# Patient Record
Sex: MALE | Race: WHITE | Employment: FULL TIME | URBAN - METROPOLITAN AREA
[De-identification: names, ages, dates, MRNs, and addresses within clinical notes are randomized per-mention and may not be internally consistent; named-entity substitution may affect disease eponyms.]

---

## 2018-04-25 PROBLEM — Z82.49 FAMILY HISTORY OF CARDIOMYOPATHY: Status: ACTIVE | Noted: 2018-04-25

## 2018-04-25 PROBLEM — I51.7 LVH (LEFT VENTRICULAR HYPERTROPHY): Status: ACTIVE | Noted: 2018-04-25

## 2018-04-25 PROBLEM — F40.243 FEAR OF FLYING: Status: ACTIVE | Noted: 2018-04-25

## 2018-04-25 PROBLEM — J45.30 MILD PERSISTENT ASTHMA WITHOUT COMPLICATION: Status: ACTIVE | Noted: 2018-04-25

## 2020-10-12 PROBLEM — F90.2 ATTENTION DEFICIT HYPERACTIVITY DISORDER (ADHD), COMBINED TYPE: Status: ACTIVE | Noted: 2020-10-12

## 2022-03-19 PROBLEM — I51.7 LVH (LEFT VENTRICULAR HYPERTROPHY): Status: ACTIVE | Noted: 2018-04-25

## 2022-03-19 PROBLEM — F40.243 FEAR OF FLYING: Status: ACTIVE | Noted: 2018-04-25

## 2022-03-19 PROBLEM — J45.30 MILD PERSISTENT ASTHMA WITHOUT COMPLICATION: Status: ACTIVE | Noted: 2018-04-25

## 2022-03-19 PROBLEM — Z82.49 FAMILY HISTORY OF CARDIOMYOPATHY: Status: ACTIVE | Noted: 2018-04-25

## 2022-03-20 PROBLEM — F90.2 ATTENTION DEFICIT HYPERACTIVITY DISORDER (ADHD), COMBINED TYPE: Status: ACTIVE | Noted: 2020-10-12

## 2022-04-22 PROBLEM — E78.2 MIXED HYPERLIPIDEMIA: Status: ACTIVE | Noted: 2022-04-22

## 2022-04-22 PROBLEM — I10 HTN (HYPERTENSION), BENIGN: Status: ACTIVE | Noted: 2022-04-22

## 2022-05-09 PROBLEM — R07.2 PRECORDIAL PAIN: Status: ACTIVE | Noted: 2022-05-09

## 2022-05-17 ENCOUNTER — HOSPITAL ENCOUNTER (OUTPATIENT)
Dept: CT IMAGING | Age: 32
Discharge: HOME | End: 2022-05-17
Attending: INTERNAL MEDICINE
Payer: SELF-PAY

## 2022-05-17 DIAGNOSIS — Z82.49 FAMILY HISTORY OF CARDIOMYOPATHY: ICD-10-CM

## 2022-05-17 DIAGNOSIS — I51.7 LVH (LEFT VENTRICULAR HYPERTROPHY): ICD-10-CM

## 2022-05-17 PROCEDURE — 75571 CT HRT W/O DYE W/CA TEST: CPT

## 2022-05-27 ENCOUNTER — HOSPITAL ENCOUNTER (OUTPATIENT)
Dept: SLEEP CENTER | Age: 32
Discharge: HOME | End: 2022-05-30
Payer: COMMERCIAL

## 2022-05-31 PROCEDURE — 95806 SLEEP STUDY UNATT&RESP EFFT: CPT

## 2022-06-07 ENCOUNTER — TELEPHONE (OUTPATIENT)
Dept: SLEEP MEDICINE | Age: 32
End: 2022-06-07

## 2022-06-13 NOTE — PROGRESS NOTES
week.  On the weekends does not feel any better. He indicates this is the way its been even before his recent child. Current [place] score is 8/24. Patient did have a home polysomnogram showed his AHI was 15.1 that increased to 41 in the supine position. Lowest sat was 73%. Saturation was less than 89% for 35.5 minutes. Patient was not on what to do. DIAGNOSTIC TESTS/RESULTS  Sleep Study Results- HST-5/31/22          No flowsheet data found. No flowsheet data found. Past Medical History:   Diagnosis Date    Asthma     Family history of cardiomyopathy 4/25/2018    Fear of flying 4/25/2018    LVH (left ventricular hypertrophy) 4/25/2018    Mild persistent asthma without complication 1/61/7286               Past Surgical History:   Procedure Laterality Date    ORTHOPEDIC SURGERY Right 2015    scope         Social History     Socioeconomic History    Marital status:      Spouse name: Nathalie Gauthier Number of children: 2    Years of education: Not on file    Highest education level: Not on file   Occupational History    Occupation: [employer]   Tobacco Use    Smoking status: Never Smoker    Smokeless tobacco: Never Used   Substance and Sexual Activity    Alcohol use: Yes     Comment: Drinks about 5 drinks per session 2-3 times a week    Drug use: Never     Comment: But does take it for ADHD    Sexual activity: Yes     Partners: Female   Other Topics Concern    Not on file   Social History Narrative    Patient has 1 cat that occasionally with sleep with him but not any longer. Patient has 2 children 3is a 3week-old as of 6/14/2022 and the other 3is 3years old and sleeps in his own room. Patient currently drinks 3 caffeine drinks per day     Social Determinants of Health     Financial Resource Strain:     Difficulty of Paying Living Expenses: Not on file   Food Insecurity:     Worried About Running Out of Food in the Last Year: Not on file    Adelia of Food in the Last Year: Not on file   Transportation Needs:     Lack of Transportation (Medical): Not on file    Lack of Transportation (Non-Medical): Not on file   Physical Activity:     Days of Exercise per Week: Not on file    Minutes of Exercise per Session: Not on file   Stress:     Feeling of Stress : Not on file   Social Connections:     Frequency of Communication with Friends and Family: Not on file    Frequency of Social Gatherings with Friends and Family: Not on file    Attends Jehovah's witness Services: Not on file    Active Member of [address] [place] [address] [employer] or Organizations: Not on file    Attends Club or Organization Meetings: Not on file    Marital Status: Not on file   Intimate Partner Violence:     Fear of Current or Ex-Partner: Not on file    Emotionally Abused: Not on file    Physically Abused: Not on file    Sexually Abused: Not on file   Housing Stability:     Unable to Pay for Housing in the Last Year: Not on file    Number of [place] in the Last Year: Not on file    Unstable Housing in the Last Year: Not on file            Family History   Problem Relation Age of Onset    Heart Disease Father     No Known Problems Mother          No Known Allergies      Current Outpatient Medications   Medication Sig    albuterol sulfate  (90 Base) MCG/ACT inhaler Inhale 1 puff into the lungs every 6 hours as needed    amphetamine-dextroamphetamine (ADDERALL XR) 30 MG extended release capsule Take 30 mg by mouth.  budesonide-formoterol (SYMBICORT) 160-4.5 MCG/ACT AERO INHALE 2 PUFFS BY MOUTH TWICE A DAY    losartan (COZAAR) 50 MG tablet Take 50 mg by mouth daily     No current facility-administered medications for this visit. SUBJECTIVE:  Review of Systems   Constitutional: Positive for fatigue. HENT:        Visit for dry mouth. Positive for snoring   Eyes: Negative. Respiratory: Negative. Cardiovascular: Positive for chest pain. Negative for palpitations and leg swelling.    Gastrointestinal: Negative. Endocrine: Negative. Genitourinary: Negative. Musculoskeletal: Negative. Allergic/Immunologic: Negative. Neurological: Negative. Psychiatric/Behavioral:        Positive for ADHD             No Known Allergies   Immunization History   Administered Date(s) Administered    Influenza Virus Vaccine 10/21/2016    Influenza, MDCK Quadv, IM, PF (Flucelvax 2 yrs and older) 09/22/2020    Influenza, Quadv, IM, PF (6 mo and older Fluzone, Flulaval, Fluarix, and 3 yrs and older Afluria) 10/23/2018    Td vaccine (adult) 05/20/2016    Tdap (Boostrix, Adacel) 08/06/2019      Current Outpatient Medications   Medication Sig Dispense Refill    albuterol sulfate  (90 Base) MCG/ACT inhaler Inhale 1 puff into the lungs every 6 hours as needed      amphetamine-dextroamphetamine (ADDERALL XR) 30 MG extended release capsule Take 30 mg by mouth.  budesonide-formoterol (SYMBICORT) 160-4.5 MCG/ACT AERO INHALE 2 PUFFS BY MOUTH TWICE A DAY      losartan (COZAAR) 50 MG tablet Take 50 mg by mouth daily       No current facility-administered medications for this visit.       Past Medical History:   Diagnosis Date    Asthma     Family history of cardiomyopathy 4/25/2018    Fear of flying 4/25/2018    LVH (left ventricular hypertrophy) 4/25/2018    Mild persistent asthma without complication 8/74/8639      Past Surgical History:   Procedure Laterality Date    ORTHOPEDIC SURGERY Right 2015    scope      Family History   Problem Relation Age of Onset    Heart Disease Father     No Known Problems Mother       Social History     Tobacco Use    Smoking status: Never Smoker    Smokeless tobacco: Never Used   Substance Use Topics    Alcohol use: Yes     Comment: Drinks about 5 drinks per session 2-3 times a week    Drug use: Never     Comment: But does take it for ADHD       OBJECTIVE:  Physical Exam:  VITALS  /80   Pulse (!) 116   Temp 97.1 °F (36.2 °C)   Resp 16   Ht 6' 5\" (1.956 m) because of  and there is no way to adjust this until the child develops a regular routine    5. ROSEon Adderall reassess in the future if he needs to continue this if his mental status and alertness level improved. SUMMARY:    Begin full face auto CPAP with a pressure of 7-15 cm H2O with humidification, auto ramp time and C-Flex of 2    Vitera fullface mask     overnight oximetry in 2 weeks on auto CPAP    Nasal saline 2 sprays/nostril tid/qid. Weight loss was encouraged through the reduction of caloric intake as well as an increase in aerobic physical activity  . Avoid significant alcohol consumption prior to bed -- Reemphasize alcohol intake next visit    Sleep hygiene was discussed with the patient today. Did review [place] score, and PSG study today. All questions are answered to the patient's satisfaction. The patient will call for further questions and concerns. Follow up at the [address] [place] [address] will be in 2 months with me. Follow up will be with the patient's referring physician as had been previously scheduled. Carly Mendoza MD    Over 50% of today's office visit was spent in face to face time reviewing test results, prognosis, importance of compliance, education about disease process, benefits of medications, instructions for management of acute flare-ups, and follow up plans. .    Electronically signed and dictated. Please note if there are errors this is  likely a result of the dictation software.     Orders Placed This Encounter   Procedures    Pulse oximetry, overnight     Standing Status:   Future     Standing Expiration Date:   2023     Scheduling Instructions:      LUKE On Cpap therapy 2 weeks after setup    [facility] - [address] [facility]  Phone:6 [id number]  [place] [address]  Dept: [phone]     Patient Name: Miguel Aldrich  : 1990  Gender: male  Address: [address] [place] [address]  Phone: [phone] (home)    Last 4 digits of SSN:      Primary Insurance: Payor: Moe Cazares / Plan: Moe Cazares / Product Type: *No Product type* /   Subscriber ID: [id number] - (Commercial)      AMB Supply Order  Order Details     DME Location: [address] [place] [address]   Order Date: 6/14/2022   There were no encounter diagnoses. (  X   )New Set-Up     machine   (     ) CPAP Unit  ( x    ) Auto CPAP Unit  (     ) BiLevel Unit  (     ) Auto BiLevel Unit  (     ) ASV   (     ) Bilevel ST    (     ) Oxygen Concentrator         Length of need: 12 months    Pressure: 7-15  cmH20  EPR: 2     Starting Ramp Pressure:   cm H20  Ramp Time: min      Patient had a diagnostic Apnea Hypopnea Index (AHI) of :  15.1    *SUPPLIES* Replace all as needed, or per coverage guidelines     Masks Type:    (  x   ) -Full Face Mask (1 per 3 mon)----------------- F&P Vitera Mask  ( x    ) -Full Mask (1 per month) Interface/Cushion      (     ) -Nasal Mask (1 per 3 mon)  (     ) - Nasal Mask (1 per month) Interface/Cushion  (     ) -Pillow (2 per mon)  (     ) -Khzwevvkv (1 per 6 mon)      _________________________________________________________________          Other Supplies:    (  X   )-Obgbctrd (1 per 6 mon)  ( X    )-Ewlkoh Tubing (1 per 3 mon)  (  X   )- Disposable Filter (2 per mon)  (   X  )-Kgvwty Humidifier (1 per year)     (  x   )-Vgeowqowc (sometimes used with Full Face Mask) (1 per 6 mos)  (     )-Tubing-without heat (1 per 3 mos)  ( X   )-Non-Disposable Filter (1 per 6 mos)  (   x  )-Water Chamber (1 per 6 mos)  (     )-Humidifier non-heated (1 per 5 yrs)      Signed Date: 6/14/2022  Electronically Signed By: Brian Mccauley MA      No orders of the defined types were placed in this encounter.

## 2022-06-14 ENCOUNTER — OFFICE VISIT (OUTPATIENT)
Dept: SLEEP MEDICINE | Age: 32
End: 2022-06-14
Payer: COMMERCIAL

## 2022-06-14 VITALS
HEART RATE: 116 BPM | BODY MASS INDEX: 34.95 KG/M2 | OXYGEN SATURATION: 97 % | SYSTOLIC BLOOD PRESSURE: 124 MMHG | DIASTOLIC BLOOD PRESSURE: 80 MMHG | WEIGHT: 296 LBS | HEIGHT: 77 IN | RESPIRATION RATE: 16 BRPM | TEMPERATURE: 97.1 F

## 2022-06-14 DIAGNOSIS — G47.33 OSA (OBSTRUCTIVE SLEEP APNEA): Primary | ICD-10-CM

## 2022-06-14 DIAGNOSIS — E66.9 OBESITY (BMI 30-39.9): ICD-10-CM

## 2022-06-14 DIAGNOSIS — R09.02 HYPOXEMIA: ICD-10-CM

## 2022-06-14 DIAGNOSIS — F90.2 ATTENTION DEFICIT HYPERACTIVITY DISORDER (ADHD), COMBINED TYPE: ICD-10-CM

## 2022-06-14 PROCEDURE — 99204 OFFICE O/P NEW MOD 45 MIN: CPT | Performed by: INTERNAL MEDICINE

## 2022-06-14 ASSESSMENT — SLEEP AND FATIGUE QUESTIONNAIRES
HOW LIKELY ARE YOU TO NOD OFF OR FALL ASLEEP WHILE SITTING AND TALKING TO SOMEONE: 0
HOW LIKELY ARE YOU TO NOD OFF OR FALL ASLEEP WHILE SITTING QUIETLY AFTER LUNCH WITHOUT ALCOHOL: 1
ESS TOTAL SCORE: 9
HOW LIKELY ARE YOU TO NOD OFF OR FALL ASLEEP WHILE SITTING AND READING: 2
HOW LIKELY ARE YOU TO NOD OFF OR FALL ASLEEP WHILE WATCHING TV: 2
HOW LIKELY ARE YOU TO NOD OFF OR FALL ASLEEP IN A CAR, WHILE STOPPED FOR A FEW MINUTES IN TRAFFIC: 0
HOW LIKELY ARE YOU TO NOD OFF OR FALL ASLEEP WHILE SITTING INACTIVE IN A PUBLIC PLACE: 1
HOW LIKELY ARE YOU TO NOD OFF OR FALL ASLEEP WHILE LYING DOWN TO REST IN THE AFTERNOON WHEN CIRCUMSTANCES PERMIT: 2
HOW LIKELY ARE YOU TO NOD OFF OR FALL ASLEEP WHEN YOU ARE A PASSENGER IN A CAR FOR AN HOUR WITHOUT A BREAK: 1

## 2022-06-14 ASSESSMENT — ENCOUNTER SYMPTOMS
ALLERGIC/IMMUNOLOGIC NEGATIVE: 1
RESPIRATORY NEGATIVE: 1
EYES NEGATIVE: 1
GASTROINTESTINAL NEGATIVE: 1

## 2022-07-20 ENCOUNTER — OFFICE VISIT (OUTPATIENT)
Dept: CARDIOLOGY CLINIC | Age: 32
End: 2022-07-20
Payer: COMMERCIAL

## 2022-07-20 VITALS
HEIGHT: 77 IN | HEART RATE: 116 BPM | SYSTOLIC BLOOD PRESSURE: 126 MMHG | WEIGHT: 298 LBS | DIASTOLIC BLOOD PRESSURE: 100 MMHG | BODY MASS INDEX: 35.19 KG/M2

## 2022-07-20 DIAGNOSIS — E78.2 MIXED HYPERLIPIDEMIA: ICD-10-CM

## 2022-07-20 DIAGNOSIS — Z82.49 FAMILY HISTORY OF CARDIOMYOPATHY: ICD-10-CM

## 2022-07-20 DIAGNOSIS — G47.33 OSA (OBSTRUCTIVE SLEEP APNEA): ICD-10-CM

## 2022-07-20 DIAGNOSIS — I10 HTN (HYPERTENSION), BENIGN: Primary | ICD-10-CM

## 2022-07-20 PROCEDURE — 99214 OFFICE O/P EST MOD 30 MIN: CPT | Performed by: INTERNAL MEDICINE

## 2022-07-20 NOTE — PROGRESS NOTES
[address], [address] [email or web address], [address] [place] [address]  PHONE: [phone]     22    NAME:  Chris Yung  : 1990  MRN: [id number]       SUBJECTIVE:   Chris Yung is a 28 y.o. male seen for a follow up visit regarding the following:     Chief Complaint   Patient presents with    Hypertension     Strecho        HPI:   Here with family hx of CM----felt to be NICM, non-HOCM. \"Family history of cardiomyopathy. Details unknown. At the appointment, the patient discussed possible history of hypertrophic cardiomyopathy, but after careful discussion it appears  that this was less likely diagnosis. The patient's father does have a defibrillator, details of his disease are unknown. \"    SE 2022: Risk Assessment: Low risk   Ca score 2022: 5  MARIEL on CPAP. New baby, 2 children at home. On CPAP now, some BARR. NO CP. Patient denies recent history of orthopnea, PND, excessive dizziness and/or syncope. Past Medical History, Past Surgical History, Family history, Social History, and Medications were all reviewed with the patient today and updated as necessary. Current Outpatient Medications   Medication Sig Dispense Refill    albuterol sulfate  (90 Base) MCG/ACT inhaler Inhale 1 puff into the lungs every 6 hours as needed      amphetamine-dextroamphetamine (ADDERALL XR) 30 MG extended release capsule Take 30 mg by mouth.      budesonide-formoterol (SYMBICORT) 160-4.5 MCG/ACT AERO INHALE 2 PUFFS BY MOUTH TWICE A DAY      losartan (COZAAR) 50 MG tablet Take 50 mg by mouth daily       No current facility-administered medications for this visit.         No Known Allergies  Patient Active Problem List    Diagnosis Date Noted    MARIEL (obstructive sleep apnea) 2022     Priority: Medium    Hypoxemia 2022     Priority: Medium    Obesity (BMI 30-39.9) 2022     Priority: Medium    Precordial pain 2022    HTN (hypertension), benign 04/22/2022    Mixed hyperlipidemia 04/22/2022    Attention deficit hyperactivity disorder (ADHD), combined type 10/12/2020    Family history of cardiomyopathy 04/25/2018    LVH (left ventricular hypertrophy) 04/25/2018    Fear of flying 04/25/2018    Mild persistent asthma without complication 55/00/5037      Past Surgical History:   Procedure Laterality Date    ORTHOPEDIC SURGERY Right 2015    scope     Family History   Problem Relation Age of Onset    Heart Disease Father     No Known Problems Mother      Social History     Tobacco Use    Smoking status: Never    Smokeless tobacco: Never   Substance Use Topics    Alcohol use: Yes     Comment: Drinks about 5 drinks per session 2-3 times a week         ROS:    No obvious pertinent positives on review of systems except for what was outlined in the HPI today.       PHYSICAL EXAM:     BP (!) 126/100   Pulse (!) 116   Ht 6' 5\" (1.956 m)   Wt 298 lb (135.2 kg)   BMI 35.34 kg/m²    General/Constitutional:   Alert and oriented x 3, no acute distress  HEENT:   normocephalic, atraumatic, moist mucous membranes  Neck:   No JVD or carotid bruits bilaterally  Cardiovascular:   regular rate and rhythm, no murmur/rub/gallop appreciated  Pulmonary:   clear to auscultation bilaterally, no respiratory distress  Abdomen:   soft, non-tender, non-distended  Ext:   No sig LE edema bilaterally  Skin:  warm and dry, no obvious rashes seen  Neuro:   no obvious sensory or motor deficits  Psychiatric:   normal mood and affect      Lab Results   Component Value Date/Time     04/22/2022 09:14 AM    K 4.8 04/22/2022 09:14 AM     04/22/2022 09:14 AM    CO2 22 04/22/2022 09:14 AM    BUN 10 04/22/2022 09:14 AM    CREATININE 0.89 04/22/2022 09:14 AM    GLUCOSE 95 04/22/2022 09:14 AM    CALCIUM 9.4 04/22/2022 09:14 AM        Lab Results   Component Value Date    WBC 5.0 04/22/2022    HGB 15.0 04/22/2022    HCT 43.3 04/22/2022    MCV 89 04/22/2022     04/22/2022       Lab Results   Component Value Date    TSH 1.880 04/22/2022       No results found for: LABA1C  No results found for: EAG    Lab Results   Component Value Date    CHOL 148 04/22/2022    CHOL 144 09/10/2020     Lab Results   Component Value Date    TRIG 98 04/22/2022    TRIG 131 09/10/2020     Lab Results   Component Value Date    HDL 35 (L) 04/22/2022    HDL 46 09/10/2020     Lab Results   Component Value Date    LDLCALC 95 04/22/2022    LDLCALC 75 09/10/2020     Lab Results   Component Value Date    VLDL 18 04/22/2022    VLDL 23 09/10/2020     No results found for: CHOLHDLRATIO        I have Independently reviewed prior care notes, any ER records available, cardiac testing, labs and results with the patient and before seeing the patient today. Also independently reviewed outside records when available. ASSESSMENT:    Angel Lainez was seen today for hypertension. Diagnoses and all orders for this visit:    HTN (hypertension), benign    MARIEL (obstructive sleep apnea)    Family history of cardiomyopathy    Mixed hyperlipidemia        PLAN:      1. Fam hx:  Follow, EF normal.  On ARB. Follow, echo in 5 yrs. The patient has been instructed to call with any angina or equivalent as reviewed today. All questions were answered with the patient voicing complete understanding. Focus on diet, exercise and weight loss. This is key for him! 2. CP:  Patient presents for followup of recent cardiac stress testing. The stress test showed normal LV function with no evidence of ischemia. We discussed the reassuring results of the stress test.  We also discused the importance of ongoing risk factor modification for the prevention of future cardiovascular events. A healthy lifestyle was discussed.  This would include heart-healthy diet, regular aerobic exercises, maintenance of desirable body weight and avoidance of tobacco products  Patient is encouraged to contact the office with any recurrent symptoms or concerns as reviewed today. All questions were answered with the patient voicing understanding. 3. Fam hx/CP:  follow. Diet and exercise is key for him. 4. HTN: on ARB, follow. 5. MARIEL:  on CPAP. Instructed patient to follow low sodium diet. Monitor BP at home, will review at follow up. Aim for at least 30 minutes moderate physical activity at least 5 days a week. If needed, work on stress reduction and lifestyle modification. Patient has been instructed and agrees to call our office with any issues or other concerns related to their cardiac condition(s) and/or complaint(s). Return in about 1 year (around 7/20/2023).        MARK CASTELLANOS, DO  7/20/2022

## 2022-09-07 DIAGNOSIS — J45.30 MILD PERSISTENT ASTHMA, UNCOMPLICATED: ICD-10-CM

## 2022-09-07 RX ORDER — BUDESONIDE AND FORMOTEROL FUMARATE DIHYDRATE 160; 4.5 UG/1; UG/1
AEROSOL RESPIRATORY (INHALATION)
Qty: 10 G | Refills: 5 | Status: SHIPPED | OUTPATIENT
Start: 2022-09-07

## 2023-01-17 ENCOUNTER — OFFICE VISIT (OUTPATIENT)
Dept: CARDIOLOGY CLINIC | Age: 33
End: 2023-01-17
Payer: COMMERCIAL

## 2023-01-17 VITALS
DIASTOLIC BLOOD PRESSURE: 84 MMHG | WEIGHT: 301.7 LBS | HEART RATE: 110 BPM | SYSTOLIC BLOOD PRESSURE: 125 MMHG | HEIGHT: 77 IN | BODY MASS INDEX: 35.62 KG/M2

## 2023-01-17 DIAGNOSIS — I10 HTN (HYPERTENSION), BENIGN: ICD-10-CM

## 2023-01-17 DIAGNOSIS — G47.33 OSA (OBSTRUCTIVE SLEEP APNEA): ICD-10-CM

## 2023-01-17 DIAGNOSIS — I51.7 LVH (LEFT VENTRICULAR HYPERTROPHY): Primary | ICD-10-CM

## 2023-01-17 DIAGNOSIS — E78.2 MIXED HYPERLIPIDEMIA: ICD-10-CM

## 2023-01-17 PROCEDURE — 3079F DIAST BP 80-89 MM HG: CPT | Performed by: INTERNAL MEDICINE

## 2023-01-17 PROCEDURE — 99214 OFFICE O/P EST MOD 30 MIN: CPT | Performed by: INTERNAL MEDICINE

## 2023-01-17 PROCEDURE — 3074F SYST BP LT 130 MM HG: CPT | Performed by: INTERNAL MEDICINE

## 2023-01-17 RX ORDER — LOSARTAN POTASSIUM 50 MG/1
TABLET ORAL
Qty: 30 TABLET | Refills: 5 | Status: SHIPPED | OUTPATIENT
Start: 2023-01-17

## 2023-01-17 NOTE — PROGRESS NOTES
[address], [address]  PHONE: [phone]     23    NAME:  Miguel Aldrich  : 1990  MRN: [id number]       SUBJECTIVE:   Miguel Aldrich is a 33 y.o. male seen for a follow up visit regarding the following:     Chief Complaint   Patient presents with    Hypertension       HPI:   Here with family hx of CM----felt to be NICM, non-HOCM. \"Family history of cardiomyopathy. Details unknown. At the appointment, the patient discussed possible history of hypertrophic cardiomyopathy, but after careful discussion it appears  that this was less likely diagnosis. The patient's father does have a defibrillator, details of his disease are unknown. \"     SE 2022:  Low risk   Ca score 2022: 5  MARIEL on CPAP. Feeling well, gained some weight. 2 children at home. On CPAP now, some BARR. NO CP. Diet ok now. NO new angina. Patient denies recent history of orthopnea, PND, excessive dizziness and/or syncope. Past Medical History, Past Surgical History, Family history, Social History, and Medications were all reviewed with the patient today and updated as necessary. Current Outpatient Medications   Medication Sig Dispense Refill    losartan (COZAAR) 50 MG tablet TAKE 1 TABLET BY MOUTH EVERY DAY 30 tablet 5    SYMBICORT 160-4.5 MCG/ACT AERO TAKE 2 PUFFS BY MOUTH TWICE A DAY 10 g 5    albuterol sulfate  (90 Base) MCG/ACT inhaler Inhale 1 puff into the lungs every 6 hours as needed      amphetamine-dextroamphetamine (ADDERALL XR) 30 MG extended release capsule Take 30 mg by mouth. No current facility-administered medications for this visit.         No Known Allergies  Patient Active Problem List    Diagnosis Date Noted    MARIEL (obstructive sleep apnea) 2022     Priority: Medium    Hypoxemia 2022     Priority: Medium    Obesity (BMI 30-39.9) 2022     Priority: Medium    Precordial pain 2022    HTN (hypertension), benign 04/22/2022    Mixed hyperlipidemia 04/22/2022    Attention deficit hyperactivity disorder (ADHD), combined type 10/12/2020    Family history of cardiomyopathy 04/25/2018    LVH (left ventricular hypertrophy) 04/25/2018    Fear of flying 04/25/2018    Mild persistent asthma without complication 39/40/5816      Past Surgical History:   Procedure Laterality Date    ORTHOPEDIC SURGERY Right 2015    scope     Family History   Problem Relation Age of Onset    Heart Disease Father     No Known Problems Mother      Social History     Tobacco Use    Smoking status: Never    Smokeless tobacco: Never   Substance Use Topics    Alcohol use: Yes     Comment: Drinks about 5 drinks per session 2-3 times a week         ROS:    No obvious pertinent positives on review of systems except for what was outlined in the HPI today.       PHYSICAL EXAM:     /84   Pulse (!) 110   Ht 6' 5\" (1.956 m)   Wt (!) 301 lb 11.2 oz (136.9 kg)   BMI 35.78 kg/m²    General/Constitutional:   Alert and oriented x 3, no acute distress  HEENT:   normocephalic, atraumatic, moist mucous membranes  Neck:   No JVD or carotid bruits bilaterally  Cardiovascular:   regular rate and rhythm, no murmur/rub/gallop appreciated  Pulmonary:   clear to auscultation bilaterally, no respiratory distress  Abdomen:   soft, non-tender, non-distended  Ext:   No sig LE edema bilaterally  Skin:  warm and dry, no obvious rashes seen  Neuro:   no obvious sensory or motor deficits  Psychiatric:   normal mood and affect      Lab Results   Component Value Date/Time     04/22/2022 09:14 AM    K 4.8 04/22/2022 09:14 AM     04/22/2022 09:14 AM    CO2 22 04/22/2022 09:14 AM    BUN 10 04/22/2022 09:14 AM    CREATININE 0.89 04/22/2022 09:14 AM    GLUCOSE 95 04/22/2022 09:14 AM    CALCIUM 9.4 04/22/2022 09:14 AM        Lab Results   Component Value Date    WBC 5.0 04/22/2022    HGB 15.0 04/22/2022    HCT 43.3 04/22/2022    MCV 89 04/22/2022    PLT 172 04/22/2022       Lab Results   Component Value Date    TSH 1.880 04/22/2022       No results found for: LABA1C  No results found for: EAG    Lab Results   Component Value Date    CHOL 148 04/22/2022    CHOL 144 09/10/2020     Lab Results   Component Value Date    TRIG 98 04/22/2022    TRIG 131 09/10/2020     Lab Results   Component Value Date    HDL 35 (L) 04/22/2022    HDL 46 09/10/2020     Lab Results   Component Value Date    LDLCALC 95 04/22/2022    LDLCALC 75 09/10/2020     Lab Results   Component Value Date    VLDL 18 04/22/2022    VLDL 23 09/10/2020     No results found for: CHOLHDLRATIO        I have Independently reviewed prior care notes, any ER records available, cardiac testing, labs and results with the patient and before seeing the patient today. Also independently reviewed outside records when available. ASSESSMENT:    Keanu Toro was seen today for hypertension. Diagnoses and all orders for this visit:    LVH (left ventricular hypertrophy)    HTN (hypertension), benign    MARIEL (obstructive sleep apnea)    Mixed hyperlipidemia        PLAN:      1. Fam hx:  Follow, EF normal.  On ARB. Follow, echo in 5 yrs. Focus on diet, exercise and weight loss. This is key for him! 2. CP:  needs weight loss and exercise. 3. Fam hx/CP:  follow. Diet and exercise is key for him. 4. HTN: on ARB, follow. HR up, he blames adderall, follow. 5. MARIEL:  on CPAP. Needs full labs this spring. Patient has been instructed and agrees to call our office with any issues or other concerns related to their cardiac condition(s) and/or complaint(s). Return in about 6 months (around 7/17/2023).        MARK CASTELLANOS,   1/17/2023

## 2023-03-05 RX ORDER — ALBUTEROL SULFATE 90 UG/1
AEROSOL, METERED RESPIRATORY (INHALATION)
Qty: 8.5 EACH | Refills: 5 | Status: SHIPPED | OUTPATIENT
Start: 2023-03-05

## 2023-04-28 ENCOUNTER — OFFICE VISIT (OUTPATIENT)
Dept: INTERNAL MEDICINE CLINIC | Facility: CLINIC | Age: 33
End: 2023-04-28
Payer: COMMERCIAL

## 2023-04-28 VITALS
DIASTOLIC BLOOD PRESSURE: 74 MMHG | BODY MASS INDEX: 36.43 KG/M2 | RESPIRATION RATE: 17 BRPM | OXYGEN SATURATION: 98 % | WEIGHT: 293 LBS | HEIGHT: 75 IN | SYSTOLIC BLOOD PRESSURE: 120 MMHG | HEART RATE: 105 BPM

## 2023-04-28 DIAGNOSIS — J45.30 MILD PERSISTENT ASTHMA, UNCOMPLICATED: ICD-10-CM

## 2023-04-28 DIAGNOSIS — I10 HTN (HYPERTENSION), BENIGN: Primary | ICD-10-CM

## 2023-04-28 DIAGNOSIS — G47.33 OSA (OBSTRUCTIVE SLEEP APNEA): ICD-10-CM

## 2023-04-28 DIAGNOSIS — F90.2 ATTENTION DEFICIT HYPERACTIVITY DISORDER (ADHD), COMBINED TYPE: ICD-10-CM

## 2023-04-28 DIAGNOSIS — Z82.49 FAMILY HISTORY OF CARDIOMYOPATHY: ICD-10-CM

## 2023-04-28 PROBLEM — I51.7 LVH (LEFT VENTRICULAR HYPERTROPHY): Status: RESOLVED | Noted: 2018-04-25 | Resolved: 2023-04-28

## 2023-04-28 PROBLEM — R07.2 PRECORDIAL PAIN: Status: RESOLVED | Noted: 2022-05-09 | Resolved: 2023-04-28

## 2023-04-28 PROBLEM — R09.02 HYPOXEMIA: Status: RESOLVED | Noted: 2022-06-14 | Resolved: 2023-04-28

## 2023-04-28 LAB
ERYTHROCYTE [DISTWIDTH] IN BLOOD BY AUTOMATED COUNT: 13.9 % (ref 11.9–14.6)
HCT VFR BLD AUTO: 46.3 % (ref 41.1–50.3)
HGB BLD-MCNC: 15.3 G/DL (ref 13.6–17.2)
MCH RBC QN AUTO: 29.9 PG (ref 26.1–32.9)
MCHC RBC AUTO-ENTMCNC: 33 G/DL (ref 31.4–35)
MCV RBC AUTO: 90.4 FL (ref 82–102)
NRBC # BLD: 0 K/UL (ref 0–0.2)
PLATELET # BLD AUTO: 220 K/UL (ref 150–450)
PMV BLD AUTO: 9 FL (ref 9.4–12.3)
RBC # BLD AUTO: 5.12 M/UL (ref 4.23–5.6)
WBC # BLD AUTO: 6.2 K/UL (ref 4.3–11.1)

## 2023-04-28 PROCEDURE — 3078F DIAST BP <80 MM HG: CPT | Performed by: FAMILY MEDICINE

## 2023-04-28 PROCEDURE — 3074F SYST BP LT 130 MM HG: CPT | Performed by: FAMILY MEDICINE

## 2023-04-28 PROCEDURE — 99214 OFFICE O/P EST MOD 30 MIN: CPT | Performed by: FAMILY MEDICINE

## 2023-04-28 RX ORDER — LOSARTAN POTASSIUM 50 MG/1
50 TABLET ORAL DAILY
Qty: 90 TABLET | Refills: 3 | Status: SHIPPED | OUTPATIENT
Start: 2023-04-28

## 2023-04-28 RX ORDER — BUDESONIDE AND FORMOTEROL FUMARATE DIHYDRATE 160; 4.5 UG/1; UG/1
2 AEROSOL RESPIRATORY (INHALATION) 2 TIMES DAILY
Qty: 10 G | Refills: 11 | Status: SHIPPED | OUTPATIENT
Start: 2023-04-28

## 2023-04-28 RX ORDER — ALBUTEROL SULFATE 90 UG/1
AEROSOL, METERED RESPIRATORY (INHALATION)
Qty: 8.5 EACH | Refills: 11 | Status: SHIPPED | OUTPATIENT
Start: 2023-04-28

## 2023-04-28 ASSESSMENT — PATIENT HEALTH QUESTIONNAIRE - PHQ9
SUM OF ALL RESPONSES TO PHQ9 QUESTIONS 1 & 2: 0
1. LITTLE INTEREST OR PLEASURE IN DOING THINGS: 0
SUM OF ALL RESPONSES TO PHQ QUESTIONS 1-9: 0
2. FEELING DOWN, DEPRESSED OR HOPELESS: 0
SUM OF ALL RESPONSES TO PHQ QUESTIONS 1-9: 0

## 2023-04-28 NOTE — PROGRESS NOTES
Meron Stephenson DO  [employer] [facility] [address] [place] [address] Internal Medicine      Sudheer Cheung (: 1990) is a 33 y.o. male, here for evaluation of the following chief complaint(s):  Hypertension       ASSESSMENT/PLAN:  1. HTN (hypertension), benign  -     losartan (COZAAR) 50 MG tablet; Take 1 tablet by mouth daily, Disp-90 tablet, R-3Normal  -     Basic Metabolic Panel; Future  -     Hepatic Function Panel; Future  -     Lipid Panel; Future  -     CBC; Future  2. Mild persistent asthma, uncomplicated  -     albuterol sulfate HFA (PROVENTIL;VENTOLIN;PROAIR) 108 (90 Base) MCG/ACT inhaler; TAKE 1 PUFF BY INHALATION EVERY 6 HOURS AS NEEDED FOR WHEEZING. INDICATIONS: BRONCHOSPASM PREVENTION, Disp-8.5 each, R-11Normal  -     budesonide-formoterol (SYMBICORT) 160-4.5 MCG/ACT AERO; Inhale 2 puffs into the lungs 2 times daily, Disp-10 g, R-11Normal  3. Attention deficit hyperactivity disorder (ADHD), combined type  4. Family history of cardiomyopathy  5. MARIEL (obstructive sleep apnea)     -Tolerating medication well for HTN. Achieving desired therapeutic response with   Key Anti-Hypertensive Meds            losartan (COZAAR) 50 MG tablet (Taking)    Sig - Route: Take 1 tablet by mouth daily - Oral         --will continue. Will periodically review and adjust if needed. Encourage home monitoring.   -Will continue with Symbicort and albuterol prn. Providing good clinical benefit and tolerating well. -ADHD managed by Psychiatry.  -Follow up with Cardiology periodically.  -Encouraged continued use of CPAP. -Labs as ordered. SUBJECTIVE/OBJECTIVE:  HPI:  -HTN: Home BP Monitoring: yes. Taking medications as instructed, no medication side effects noted. He denies chest pain, palpitations, exertional pain or pressure.  -Asthma has been well controlled. Using inhalers as directed and feels effective. No recent exacerbation. -ADHD has been well controlled.   -Using CPAP as

## 2023-04-29 LAB
ALBUMIN SERPL-MCNC: 4.2 G/DL (ref 3.5–5)
ALBUMIN/GLOB SERPL: 1.3 (ref 0.4–1.6)
ALP SERPL-CCNC: 130 U/L (ref 50–136)
ALT SERPL-CCNC: 42 U/L (ref 12–65)
ANION GAP SERPL CALC-SCNC: 0 MMOL/L (ref 2–11)
AST SERPL-CCNC: 22 U/L (ref 15–37)
BILIRUB DIRECT SERPL-MCNC: 0.2 MG/DL
BILIRUB SERPL-MCNC: 0.7 MG/DL (ref 0.2–1.1)
BUN SERPL-MCNC: 10 MG/DL (ref 6–23)
CALCIUM SERPL-MCNC: 9.1 MG/DL (ref 8.3–10.4)
CHLORIDE SERPL-SCNC: 110 MMOL/L (ref 101–110)
CHOLEST SERPL-MCNC: 149 MG/DL
CO2 SERPL-SCNC: 27 MMOL/L (ref 21–32)
CREAT SERPL-MCNC: 1 MG/DL (ref 0.8–1.5)
GLOBULIN SER CALC-MCNC: 3.2 G/DL (ref 2.8–4.5)
GLUCOSE SERPL-MCNC: 97 MG/DL (ref 65–100)
HDLC SERPL-MCNC: 42 MG/DL (ref 40–60)
HDLC SERPL: 3.5
LDLC SERPL CALC-MCNC: 90.6 MG/DL
POTASSIUM SERPL-SCNC: 4.4 MMOL/L (ref 3.5–5.1)
PROT SERPL-MCNC: 7.4 G/DL (ref 6.3–8.2)
SODIUM SERPL-SCNC: 137 MMOL/L (ref 133–143)
TRIGL SERPL-MCNC: 82 MG/DL (ref 35–150)
VLDLC SERPL CALC-MCNC: 16.4 MG/DL (ref 6–23)

## 2023-06-03 DIAGNOSIS — J45.30 MILD PERSISTENT ASTHMA, UNCOMPLICATED: ICD-10-CM

## 2023-06-06 RX ORDER — BUDESONIDE AND FORMOTEROL FUMARATE DIHYDRATE 160; 4.5 UG/1; UG/1
AEROSOL RESPIRATORY (INHALATION)
Qty: 1 EACH | Refills: 5 | Status: SHIPPED | OUTPATIENT
Start: 2023-06-06

## 2023-11-28 DIAGNOSIS — J45.30 MILD PERSISTENT ASTHMA, UNCOMPLICATED: ICD-10-CM

## 2023-11-30 RX ORDER — ALBUTEROL SULFATE 90 UG/1
AEROSOL, METERED RESPIRATORY (INHALATION)
Qty: 8.5 EACH | Refills: 4 | Status: SHIPPED | OUTPATIENT
Start: 2023-11-30

## 2024-04-29 ENCOUNTER — OFFICE VISIT (OUTPATIENT)
Dept: INTERNAL MEDICINE CLINIC | Facility: CLINIC | Age: 34
End: 2024-04-29
Payer: COMMERCIAL

## 2024-04-29 VITALS
BODY MASS INDEX: 37.05 KG/M2 | SYSTOLIC BLOOD PRESSURE: 128 MMHG | OXYGEN SATURATION: 98 % | WEIGHT: 298 LBS | HEART RATE: 115 BPM | DIASTOLIC BLOOD PRESSURE: 70 MMHG | HEIGHT: 75 IN

## 2024-04-29 DIAGNOSIS — Z00.00 ROUTINE GENERAL MEDICAL EXAMINATION AT A HEALTH CARE FACILITY: Primary | ICD-10-CM

## 2024-04-29 DIAGNOSIS — I10 HTN (HYPERTENSION), BENIGN: ICD-10-CM

## 2024-04-29 DIAGNOSIS — L63.9 ALOPECIA AREATA: ICD-10-CM

## 2024-04-29 DIAGNOSIS — R00.0 TACHYCARDIA: ICD-10-CM

## 2024-04-29 DIAGNOSIS — J45.30 MILD PERSISTENT ASTHMA, UNCOMPLICATED: ICD-10-CM

## 2024-04-29 LAB
ALBUMIN SERPL-MCNC: 4 G/DL (ref 3.5–5)
ALBUMIN/GLOB SERPL: 1.5 (ref 1–1.9)
ALP SERPL-CCNC: 131 U/L (ref 40–129)
ALT SERPL-CCNC: 37 U/L (ref 12–65)
ANION GAP SERPL CALC-SCNC: 10 MMOL/L (ref 9–18)
AST SERPL-CCNC: 25 U/L (ref 15–37)
BILIRUB DIRECT SERPL-MCNC: 0.2 MG/DL (ref 0–0.4)
BILIRUB SERPL-MCNC: 0.8 MG/DL (ref 0–1.2)
BUN SERPL-MCNC: 14 MG/DL (ref 6–23)
CALCIUM SERPL-MCNC: 9.1 MG/DL (ref 8.8–10.2)
CHLORIDE SERPL-SCNC: 104 MMOL/L (ref 98–107)
CHOLEST SERPL-MCNC: 149 MG/DL (ref 0–200)
CO2 SERPL-SCNC: 26 MMOL/L (ref 20–28)
CREAT SERPL-MCNC: 1.08 MG/DL (ref 0.8–1.3)
ERYTHROCYTE [DISTWIDTH] IN BLOOD BY AUTOMATED COUNT: 13.1 % (ref 11.9–14.6)
GLOBULIN SER CALC-MCNC: 2.8 G/DL (ref 2.3–3.5)
GLUCOSE SERPL-MCNC: 106 MG/DL (ref 70–99)
HCT VFR BLD AUTO: 45.2 % (ref 41.1–50.3)
HDLC SERPL-MCNC: 32 MG/DL (ref 40–60)
HDLC SERPL: 4.7 (ref 0–5)
HGB BLD-MCNC: 14.6 G/DL (ref 13.6–17.2)
LDLC SERPL CALC-MCNC: 98 MG/DL (ref 0–100)
MCH RBC QN AUTO: 29.2 PG (ref 26.1–32.9)
MCHC RBC AUTO-ENTMCNC: 32.3 G/DL (ref 31.4–35)
MCV RBC AUTO: 90.4 FL (ref 82–102)
NRBC # BLD: 0 K/UL (ref 0–0.2)
PLATELET # BLD AUTO: 177 K/UL (ref 150–450)
PMV BLD AUTO: 9.6 FL (ref 9.4–12.3)
POTASSIUM SERPL-SCNC: 4.3 MMOL/L (ref 3.5–5.1)
PROT SERPL-MCNC: 6.8 G/DL (ref 6.3–8.2)
RBC # BLD AUTO: 5 M/UL (ref 4.23–5.6)
SODIUM SERPL-SCNC: 140 MMOL/L (ref 136–145)
TRIGL SERPL-MCNC: 97 MG/DL (ref 0–150)
TSH W FREE THYROID IF ABNORMAL: 1.36 UIU/ML (ref 0.27–4.2)
VLDLC SERPL CALC-MCNC: 19 MG/DL (ref 6–23)
WBC # BLD AUTO: 4.9 K/UL (ref 4.3–11.1)

## 2024-04-29 PROCEDURE — 99395 PREV VISIT EST AGE 18-39: CPT | Performed by: FAMILY MEDICINE

## 2024-04-29 PROCEDURE — 3078F DIAST BP <80 MM HG: CPT | Performed by: FAMILY MEDICINE

## 2024-04-29 PROCEDURE — 3074F SYST BP LT 130 MM HG: CPT | Performed by: FAMILY MEDICINE

## 2024-04-29 RX ORDER — ALBUTEROL SULFATE 90 UG/1
AEROSOL, METERED RESPIRATORY (INHALATION)
Qty: 3 EACH | Refills: 3 | Status: SHIPPED | OUTPATIENT
Start: 2024-04-29

## 2024-04-29 RX ORDER — BUDESONIDE AND FORMOTEROL FUMARATE DIHYDRATE 160; 4.5 UG/1; UG/1
2 AEROSOL RESPIRATORY (INHALATION) 2 TIMES DAILY
Qty: 3 EACH | Refills: 3 | Status: SHIPPED | OUTPATIENT
Start: 2024-04-29

## 2024-04-29 RX ORDER — LOSARTAN POTASSIUM 50 MG/1
50 TABLET ORAL DAILY
Qty: 90 TABLET | Refills: 3 | Status: SHIPPED | OUTPATIENT
Start: 2024-04-29

## 2024-04-29 SDOH — ECONOMIC STABILITY: INCOME INSECURITY: HOW HARD IS IT FOR YOU TO PAY FOR THE VERY BASICS LIKE FOOD, HOUSING, MEDICAL CARE, AND HEATING?: NOT HARD AT ALL

## 2024-04-29 SDOH — ECONOMIC STABILITY: HOUSING INSECURITY
IN THE LAST 12 MONTHS, WAS THERE A TIME WHEN YOU DID NOT HAVE A STEADY PLACE TO SLEEP OR SLEPT IN A SHELTER (INCLUDING NOW)?: NO

## 2024-04-29 SDOH — ECONOMIC STABILITY: FOOD INSECURITY: WITHIN THE PAST 12 MONTHS, YOU WORRIED THAT YOUR FOOD WOULD RUN OUT BEFORE YOU GOT MONEY TO BUY MORE.: NEVER TRUE

## 2024-04-29 SDOH — ECONOMIC STABILITY: FOOD INSECURITY: WITHIN THE PAST 12 MONTHS, THE FOOD YOU BOUGHT JUST DIDN'T LAST AND YOU DIDN'T HAVE MONEY TO GET MORE.: NEVER TRUE

## 2024-04-29 SDOH — ECONOMIC STABILITY: TRANSPORTATION INSECURITY
IN THE PAST 12 MONTHS, HAS LACK OF TRANSPORTATION KEPT YOU FROM MEETINGS, WORK, OR FROM GETTING THINGS NEEDED FOR DAILY LIVING?: NO

## 2024-04-29 ASSESSMENT — PATIENT HEALTH QUESTIONNAIRE - PHQ9
SUM OF ALL RESPONSES TO PHQ QUESTIONS 1-9: 0
1. LITTLE INTEREST OR PLEASURE IN DOING THINGS: NOT AT ALL
SUM OF ALL RESPONSES TO PHQ9 QUESTIONS 1 & 2: 0
2. FEELING DOWN, DEPRESSED OR HOPELESS: NOT AT ALL
SUM OF ALL RESPONSES TO PHQ QUESTIONS 1-9: 0
2. FEELING DOWN, DEPRESSED OR HOPELESS: NOT AT ALL
1. LITTLE INTEREST OR PLEASURE IN DOING THINGS: NOT AT ALL
SUM OF ALL RESPONSES TO PHQ9 QUESTIONS 1 & 2: 0

## 2024-04-29 NOTE — PROGRESS NOTES
Gael Israel,   Diplomate of the [employer] of [employer]  [facility]      Mohsen Malcolm (: 1990) is a 33 y.o. male, here for evaluation of the following chief complaint(s):  Annual Exam       ASSESSMENT/PLAN:  1. Routine general medical examination at a health care facility  -     Basic Metabolic Panel; Future  -     Hepatic Function Panel; Future  -     Lipid Panel; Future  -     CBC; Future  2. HTN (hypertension), benign  -     losartan (COZAAR) 50 MG tablet; Take 1 tablet by mouth daily, Disp-90 tablet, R-3Normal  3. Mild persistent asthma, uncomplicated  -     budesonide-formoterol (SYMBICORT) 160-4.5 MCG/ACT AERO; Inhale 2 puffs into the lungs 2 times daily, Disp-3 each, R-3Normal  -     albuterol sulfate HFA (PROVENTIL;VENTOLIN;PROAIR) 108 (90 Base) MCG/ACT inhaler; TAKE 1 PUFF BY INHALATION EVERY 6 HOURS AS NEEDED FOR WHEEZING. INDICATIONS: BRONCHOSPASM PREVENTION, Disp-3 each, R-3Normal  4. Tachycardia  -     TSH with Reflex; Future  5. Alopecia areata  -     AFL - Dawson Mccartney MD, PA     Encourage healthy eating and exercise.  Tolerating medication well for HTN. Achieving desired therapeutic response with   Hypertension Medications       Angiotensin II Receptor Antagonists       losartan (COZAAR) 50 MG tablet Take 1 tablet by mouth daily         --will continue. Will periodically review and adjust if needed.  Encourage home monitoring.   Continue with Symbicort daily as well as albuterol on a as needed basis.  Labs as ordered.  He does follow regularly with cardiology regarding tachycardia.  Referral to Derm for alopecia areata.        SUBJECTIVE/OBJECTIVE:  HPI:  Here for routine physical.  HTN: Home BP Monitoring: yes. taking medications as instructed, no medication side effects noted.  .bienvenidoros  Does have tachycardia, but states this is due to his ADD meds.  Has followed up with cardiology.  Has had chronic alopecia areata for a couple of

## 2024-05-01 DIAGNOSIS — R79.89 ELEVATED LIVER FUNCTION TESTS: Primary | ICD-10-CM

## 2024-05-01 NOTE — RESULT ENCOUNTER NOTE
Please call and let him know alk phos is up a little.  Most likely some mild fatty liver.  Other liver enzymes look okay.  Please repeat hepatic function panel/GGT in 1 month due to elevated LFTs.  Otherwise, looks okay.

## 2025-01-12 DIAGNOSIS — I10 HTN (HYPERTENSION), BENIGN: ICD-10-CM

## 2025-01-13 RX ORDER — LOSARTAN POTASSIUM 50 MG/1
50 TABLET ORAL DAILY
Qty: 90 TABLET | Refills: 3 | Status: SHIPPED | OUTPATIENT
Start: 2025-01-13

## 2025-04-30 ENCOUNTER — OFFICE VISIT (OUTPATIENT)
Dept: INTERNAL MEDICINE CLINIC | Facility: CLINIC | Age: 35
End: 2025-04-30
Payer: COMMERCIAL

## 2025-04-30 VITALS
SYSTOLIC BLOOD PRESSURE: 118 MMHG | WEIGHT: 305 LBS | OXYGEN SATURATION: 98 % | HEART RATE: 110 BPM | DIASTOLIC BLOOD PRESSURE: 80 MMHG | HEIGHT: 75 IN | BODY MASS INDEX: 37.92 KG/M2

## 2025-04-30 DIAGNOSIS — Z82.49 FAMILY HISTORY OF CARDIOMYOPATHY: ICD-10-CM

## 2025-04-30 DIAGNOSIS — I10 HTN (HYPERTENSION), BENIGN: ICD-10-CM

## 2025-04-30 DIAGNOSIS — Z00.00 ROUTINE GENERAL MEDICAL EXAMINATION AT A HEALTH CARE FACILITY: Primary | ICD-10-CM

## 2025-04-30 DIAGNOSIS — J45.30 MILD PERSISTENT ASTHMA, UNCOMPLICATED: ICD-10-CM

## 2025-04-30 DIAGNOSIS — F90.2 ATTENTION DEFICIT HYPERACTIVITY DISORDER (ADHD), COMBINED TYPE: ICD-10-CM

## 2025-04-30 DIAGNOSIS — B00.1 HERPES LABIALIS: ICD-10-CM

## 2025-04-30 DIAGNOSIS — E78.2 MIXED HYPERLIPIDEMIA: ICD-10-CM

## 2025-04-30 LAB
ALBUMIN SERPL-MCNC: 4.1 G/DL (ref 3.5–5)
ALBUMIN/GLOB SERPL: 1.3 (ref 1–1.9)
ALP SERPL-CCNC: 128 U/L (ref 40–129)
ALT SERPL-CCNC: 66 U/L (ref 8–55)
ANION GAP SERPL CALC-SCNC: 11 MMOL/L (ref 7–16)
AST SERPL-CCNC: 39 U/L (ref 15–37)
BILIRUB DIRECT SERPL-MCNC: 0.2 MG/DL (ref 0–0.3)
BILIRUB SERPL-MCNC: 0.4 MG/DL (ref 0–1.2)
BUN SERPL-MCNC: 11 MG/DL (ref 6–23)
CALCIUM SERPL-MCNC: 9.6 MG/DL (ref 8.8–10.2)
CHLORIDE SERPL-SCNC: 107 MMOL/L (ref 98–107)
CHOLEST SERPL-MCNC: 148 MG/DL (ref 0–200)
CO2 SERPL-SCNC: 26 MMOL/L (ref 20–29)
CREAT SERPL-MCNC: 1.08 MG/DL (ref 0.8–1.3)
ERYTHROCYTE [DISTWIDTH] IN BLOOD BY AUTOMATED COUNT: 13.2 % (ref 11.9–14.6)
GLOBULIN SER CALC-MCNC: 3.2 G/DL (ref 2.3–3.5)
GLUCOSE SERPL-MCNC: 100 MG/DL (ref 70–99)
HCT VFR BLD AUTO: 49 % (ref 41.1–50.3)
HDLC SERPL-MCNC: 31 MG/DL (ref 40–60)
HDLC SERPL: 4.9 (ref 0–5)
HGB BLD-MCNC: 16.2 G/DL (ref 13.6–17.2)
LDLC SERPL CALC-MCNC: 86 MG/DL (ref 0–100)
MCH RBC QN AUTO: 29.9 PG (ref 26.1–32.9)
MCHC RBC AUTO-ENTMCNC: 33.1 G/DL (ref 31.4–35)
MCV RBC AUTO: 90.6 FL (ref 82–102)
NRBC # BLD: 0 K/UL (ref 0–0.2)
PLATELET # BLD AUTO: 210 K/UL (ref 150–450)
PMV BLD AUTO: 9.2 FL (ref 9.4–12.3)
POTASSIUM SERPL-SCNC: 5 MMOL/L (ref 3.5–5.1)
PROT SERPL-MCNC: 7.4 G/DL (ref 6.3–8.2)
RBC # BLD AUTO: 5.41 M/UL (ref 4.23–5.6)
SODIUM SERPL-SCNC: 143 MMOL/L (ref 136–145)
TRIGL SERPL-MCNC: 159 MG/DL (ref 0–150)
VLDLC SERPL CALC-MCNC: 32 MG/DL (ref 6–23)
WBC # BLD AUTO: 6 K/UL (ref 4.3–11.1)

## 2025-04-30 PROCEDURE — 99395 PREV VISIT EST AGE 18-39: CPT | Performed by: FAMILY MEDICINE

## 2025-04-30 PROCEDURE — 3074F SYST BP LT 130 MM HG: CPT | Performed by: FAMILY MEDICINE

## 2025-04-30 PROCEDURE — 3079F DIAST BP 80-89 MM HG: CPT | Performed by: FAMILY MEDICINE

## 2025-04-30 RX ORDER — ALBUTEROL SULFATE 90 UG/1
INHALANT RESPIRATORY (INHALATION)
Qty: 3 EACH | Refills: 3 | Status: SHIPPED | OUTPATIENT
Start: 2025-04-30

## 2025-04-30 RX ORDER — LISDEXAMFETAMINE DIMESYLATE 60 MG/1
CAPSULE ORAL
COMMUNITY

## 2025-04-30 RX ORDER — BUDESONIDE AND FORMOTEROL FUMARATE DIHYDRATE 160; 4.5 UG/1; UG/1
2 AEROSOL RESPIRATORY (INHALATION) 2 TIMES DAILY
Qty: 3 EACH | Refills: 3 | Status: SHIPPED | OUTPATIENT
Start: 2025-04-30

## 2025-04-30 RX ORDER — BUPROPION HYDROCHLORIDE 300 MG/1
300 TABLET ORAL EVERY MORNING
COMMUNITY

## 2025-04-30 RX ORDER — VALACYCLOVIR HYDROCHLORIDE 1 G/1
2000 TABLET, FILM COATED ORAL 2 TIMES DAILY
Qty: 12 TABLET | Refills: 3 | Status: SHIPPED | OUTPATIENT
Start: 2025-04-30 | End: 2025-05-01

## 2025-04-30 SDOH — ECONOMIC STABILITY: FOOD INSECURITY: WITHIN THE PAST 12 MONTHS, YOU WORRIED THAT YOUR FOOD WOULD RUN OUT BEFORE YOU GOT MONEY TO BUY MORE.: NEVER TRUE

## 2025-04-30 SDOH — ECONOMIC STABILITY: FOOD INSECURITY: WITHIN THE PAST 12 MONTHS, THE FOOD YOU BOUGHT JUST DIDN'T LAST AND YOU DIDN'T HAVE MONEY TO GET MORE.: NEVER TRUE

## 2025-04-30 NOTE — Clinical Note
Mohsen Corcoranpam Malcolm                                                                [address] [address]         4/30/25     Dear Mr. Promise Malcolm:  MRN:[id number]    This message is regarding a referral that needs to be scheduled. We were unable to reach you by phone; however, your referral is available for review in [facility] under insurance in the drop down menu. We will be making further attempts to contact you to get this scheduled.       Thank you,  Hasmukh [facility]

## 2025-04-30 NOTE — PROGRESS NOTES
Gael Israel DO  Diplomate of the American Board of Family Medicine  [facility]    Mohsen Malcolm (: 1990) is a 34 y.o. male, here for evaluation of the following chief complaint(s):  Annual Exam     Assessment & Plan    Routine physical  Encourage healthy eating/exercise    1. Elevated liver enzymes.  Treatment plan: recheck liver enzymes    2. Asthma.  Treatment plan: Continue with symbicort/albuterol on prn basis    3. Cold sores.  Treatment plan: Prescription for Valtrex as directed.    4. Cardiac evaluation.  Treatment plan: Normal blood pressure, slightly elevated pulse rate possibly due to age. Referral for echocardiogram due to family history of cardiomyopathy and hypertension.    5. HTN  Tolerating medication well for HTN. Achieving desired therapeutic response with   Hypertension Medications       Angiotensin II Receptor Antagonists       losartan (COZAAR) 50 MG tablet TAKE 1 TABLET BY MOUTH EVERY DAY         --will continue. Will periodically review and adjust if needed.  Encourage home monitoring.     1. Routine general medical examination at a health care facility  -     Basic Metabolic Panel; Future  -     CBC; Future  2. Mild persistent asthma, uncomplicated  -     albuterol sulfate HFA (PROVENTIL;VENTOLIN;PROAIR) 108 (90 Base) MCG/ACT inhaler; TAKE 1 PUFF BY INHALATION EVERY 6 HOURS AS NEEDED FOR WHEEZING. INDICATIONS: BRONCHOSPASM PREVENTION, Disp-3 each, R-3Normal  -     budesonide-formoterol (SYMBICORT) 160-4.5 MCG/ACT AERO; Inhale 2 puffs into the lungs 2 times daily, Disp-3 each, R-3Normal  3. HTN (hypertension), benign  4. Attention deficit hyperactivity disorder (ADHD), combined type  5. Mixed hyperlipidemia  -     Hepatic Function Panel; Future  -     Lipid Panel; Future  6. Family history of cardiomyopathy  -     [facility] [place]  7. Herpes labialis  -     valACYclovir (VALTREX) 1 g tablet; Take 2 tablets by mouth 2 times  No

## 2025-05-01 ENCOUNTER — RESULTS FOLLOW-UP (OUTPATIENT)
Dept: INTERNAL MEDICINE CLINIC | Facility: CLINIC | Age: 35
End: 2025-05-01

## 2025-05-01 DIAGNOSIS — R74.8 ELEVATED LIVER ENZYMES: Primary | ICD-10-CM

## 2025-05-01 DIAGNOSIS — R79.89 ELEVATED LIVER FUNCTION TESTS: ICD-10-CM

## 2025-05-01 LAB
FERRITIN SERPL-MCNC: 327 NG/ML (ref 8–388)
GGT SERPL-CCNC: 31 U/L (ref 8–61)
IRON SATN MFR SERPL: 26 % (ref 20–50)
IRON SERPL-MCNC: 85 UG/DL (ref 35–100)
TIBC SERPL-MCNC: 328 UG/DL (ref 240–450)
UIBC SERPL-MCNC: 243 UG/DL (ref 112–347)

## 2025-05-01 NOTE — RESULT ENCOUNTER NOTE
Please call pt and let know:  -liver enzymes up a little, still suspect fatty liver:  Pls add on tibc/iron/ferritin/ggt and set up hepatic us for further workup.

## 2025-05-05 DIAGNOSIS — I10 HTN (HYPERTENSION), BENIGN: ICD-10-CM

## 2025-05-06 RX ORDER — LOSARTAN POTASSIUM 50 MG/1
50 TABLET ORAL DAILY
Qty: 90 TABLET | Refills: 3 | Status: SHIPPED | OUTPATIENT
Start: 2025-05-06

## 2025-05-11 ENCOUNTER — RESULTS FOLLOW-UP (OUTPATIENT)
Dept: INTERNAL MEDICINE CLINIC | Facility: CLINIC | Age: 35
End: 2025-05-11